# Patient Record
Sex: FEMALE | Race: WHITE | NOT HISPANIC OR LATINO | URBAN - METROPOLITAN AREA
[De-identification: names, ages, dates, MRNs, and addresses within clinical notes are randomized per-mention and may not be internally consistent; named-entity substitution may affect disease eponyms.]

---

## 2017-01-26 ENCOUNTER — EMERGENCY (EMERGENCY)
Facility: HOSPITAL | Age: 27
LOS: 1 days | Discharge: HOME | End: 2017-01-26
Attending: EMERGENCY MEDICINE | Admitting: EMERGENCY MEDICINE
Payer: COMMERCIAL

## 2017-01-26 VITALS
RESPIRATION RATE: 20 BRPM | TEMPERATURE: 98 F | DIASTOLIC BLOOD PRESSURE: 81 MMHG | WEIGHT: 125 LBS | OXYGEN SATURATION: 97 % | HEIGHT: 65 IN | SYSTOLIC BLOOD PRESSURE: 115 MMHG | HEART RATE: 93 BPM

## 2017-01-26 VITALS
DIASTOLIC BLOOD PRESSURE: 81 MMHG | RESPIRATION RATE: 20 BRPM | OXYGEN SATURATION: 97 % | HEART RATE: 117 BPM | SYSTOLIC BLOOD PRESSURE: 117 MMHG | TEMPERATURE: 97 F

## 2017-01-26 DIAGNOSIS — F10.10 ALCOHOL ABUSE, UNCOMPLICATED: ICD-10-CM

## 2017-01-26 DIAGNOSIS — S02.5XXA FRACTURE OF TOOTH (TRAUMATIC), INITIAL ENCOUNTER FOR CLOSED FRACTURE: ICD-10-CM
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
## 2017-01-26 PROCEDURE — 99284 EMERGENCY DEPT VISIT MOD MDM: CPT | Mod: 25

## 2017-01-26 PROCEDURE — 70486 CT MAXILLOFACIAL W/O DYE: CPT

## 2017-01-26 PROCEDURE — 70450 CT HEAD/BRAIN W/O DYE: CPT

## 2017-01-26 PROCEDURE — 70486 CT MAXILLOFACIAL W/O DYE: CPT | Mod: 26

## 2017-01-26 PROCEDURE — 70450 CT HEAD/BRAIN W/O DYE: CPT | Mod: 26

## 2017-01-26 NOTE — ED PROVIDER NOTE - CARE PLAN
Principal Discharge DX:	Fracture of tooth, initial encounter  Secondary Diagnosis:	Alcohol abuse  Secondary Diagnosis:	Fall, initial encounter

## 2017-01-26 NOTE — ED PROVIDER NOTE - MEDICAL DECISION MAKING DETAILS
tetanus PPX utd , CT maxofacial and head completed + Alert and coordinated in ED at d/c + supported by friends + dental prompt f/u stressed

## 2017-01-26 NOTE — ED ADULT NURSE NOTE - OBJECTIVE STATEMENT
Pt to the ER c/o lip pain s/p mechanical fall.  Pt states she was drinking with friends this evening and tripped and fell, hitting her teeth against her lip.  Pt c/o lip and tooth pain.  No lip laceration noted.  Pt denies LOC.  Pt with abrasion to knee.  Pt denies fevers, sick contacts, or travel out of the country in the last 3 weeks.

## 2017-01-26 NOTE — ED PROVIDER NOTE - ATTENDING CONTRIBUTION TO CARE
26F no PMH s/p trip and fall. +alcohol.  no LOC. no vomiting. +injury to front teeth.    gen- nad  heent- ncat, clear conj, +dental fractures  cv -rrr  lungs -ctab  abd - soft, nt, nd  ext -wwp, no edema  neuro -aox3, steady gait, saldaña  no intracranial bleed, recommend dental f/u. steady gait, fluent speech, alerted to dangers of excessive alcohol ingestion

## 2017-01-26 NOTE — ED ADULT TRIAGE NOTE - CHIEF COMPLAINT QUOTE
pt c/o trip and fell, landed with her left side, "I bit  my lip when I fell, now my tooth hurting, left knee pain". alcohol on breath, pt admits drinking, denies H/A, dizziness, no N/V, no vision charges. pt escorted by her friends, per friends no LOC